# Patient Record
(demographics unavailable — no encounter records)

---

## 2024-10-15 NOTE — PHYSICAL EXAM
[Normal Breath Sounds] : Normal breath sounds [Normal Heart Sounds] : normal heart sounds [Normal Rate and Rhythm] : normal rate and rhythm [No Rash or Lesion] : No rash or lesion [Alert] : alert [Oriented to Person] : oriented to person [Oriented to Place] : oriented to place [Oriented to Time] : oriented to time [Calm] : calm [de-identified] : Healthy-appearing gentleman in no acute distress [de-identified] : SUGEY, TITI, RICARDOMI. [de-identified] : Soft, nontender nondistended, positive bowel sounds in all four quads.  No hernia or masses.  Surgical incisions remain well approximated and healing appropriately without erythema, induration or fluctuance. [de-identified] : Ambulating without difficulty or assistance

## 2024-10-15 NOTE — HISTORY OF PRESENT ILLNESS
[de-identified] : Postoperative follow-up status post robotic assisted laparoscopic repair of a right inguinal hernia.  Preoperatively this was believed to be secondary to an elongated right inguinal/lower quadrant incision from previous open appendectomy.  At the time of surgery however this was identified to be a direct inguinal hernia.

## 2024-10-15 NOTE — ASSESSMENT
[FreeTextEntry1] : Routine postoperative follow-up status post robotic assisted laparoscopic repair of a right inguinal hernia with primary repair of a small umbilical hernia.  Preoperatively it was believed the patient had an incisional hernia in the right groin from previous open appendectomy however upon diagnostic laparoscopy was found to be a direct right inguinal hernia.  Standard Mariano repair was performed with mesh.  Primary closure of the umbilical hernia was also performed.  Patient returns today with unremarkable postoperative course and no adverse events or symptoms.  He describes only mild pulling sensation in the right groin which is consistent with postoperative changes.  No evidence of recurrent hernia or masses.  Postoperative instructions have been provided including avoidance of heavy lifting and strenuous activities in excess of 20-25 pounds for duration of 4-6 weeks. The patient may shower keeping the incisions clean, dry, covered as needed.  Follow-up in 1 month as needed